# Patient Record
Sex: FEMALE | Race: WHITE | Employment: FULL TIME | URBAN - METROPOLITAN AREA
[De-identification: names, ages, dates, MRNs, and addresses within clinical notes are randomized per-mention and may not be internally consistent; named-entity substitution may affect disease eponyms.]

---

## 2017-03-27 ENCOUNTER — TELEPHONE (OUTPATIENT)
Dept: OBGYN | Age: 32
End: 2017-03-27

## 2017-03-27 DIAGNOSIS — G44.209 TENSION-TYPE HEADACHE, NOT INTRACTABLE, UNSPECIFIED CHRONICITY PATTERN: Primary | ICD-10-CM

## 2017-03-27 RX ORDER — BUTALBITAL, ACETAMINOPHEN AND CAFFEINE 50; 325; 40 MG/1; MG/1; MG/1
1 TABLET ORAL EVERY 4 HOURS PRN
Qty: 50 TABLET | Refills: 3 | Status: SHIPPED | OUTPATIENT
Start: 2017-03-27 | End: 2018-10-11

## 2017-03-28 ENCOUNTER — TELEPHONE (OUTPATIENT)
Dept: OBGYN | Age: 32
End: 2017-03-28

## 2017-03-28 DIAGNOSIS — G43.009 MIGRAINE WITHOUT AURA AND WITHOUT STATUS MIGRAINOSUS, NOT INTRACTABLE: Primary | ICD-10-CM

## 2017-03-28 RX ORDER — SUMATRIPTAN 50 MG/1
50 TABLET, FILM COATED ORAL
Qty: 9 TABLET | Refills: 3 | Status: SHIPPED | OUTPATIENT
Start: 2017-03-28 | End: 2018-10-11

## 2017-05-09 ENCOUNTER — OFFICE VISIT (OUTPATIENT)
Dept: OBGYN | Age: 32
End: 2017-05-09
Payer: COMMERCIAL

## 2017-05-09 ENCOUNTER — HOSPITAL ENCOUNTER (OUTPATIENT)
Age: 32
Setting detail: SPECIMEN
Discharge: HOME | End: 2017-05-09
Payer: COMMERCIAL

## 2017-05-09 VITALS
DIASTOLIC BLOOD PRESSURE: 68 MMHG | SYSTOLIC BLOOD PRESSURE: 134 MMHG | HEIGHT: 68 IN | WEIGHT: 192.8 LBS | BODY MASS INDEX: 29.22 KG/M2

## 2017-05-09 DIAGNOSIS — M79.10 MUSCLE ACHE: ICD-10-CM

## 2017-05-09 DIAGNOSIS — Z01.419 WOMEN'S ANNUAL ROUTINE GYNECOLOGICAL EXAMINATION: Primary | ICD-10-CM

## 2017-05-09 DIAGNOSIS — F43.21 SITUATIONAL DEPRESSION: ICD-10-CM

## 2017-05-09 DIAGNOSIS — E66.3 OVERWEIGHT (BMI 25.0-29.9): ICD-10-CM

## 2017-05-09 PROCEDURE — G0145 SCR C/V CYTO,THINLAYER,RESCR: HCPCS

## 2017-05-09 PROCEDURE — 99395 PREV VISIT EST AGE 18-39: CPT | Performed by: OBSTETRICS & GYNECOLOGY

## 2017-05-09 RX ORDER — IBUPROFEN 800 MG/1
800 TABLET ORAL EVERY 8 HOURS PRN
Qty: 90 TABLET | Refills: 5 | Status: SHIPPED | OUTPATIENT
Start: 2017-05-09 | End: 2018-10-11 | Stop reason: SDUPTHER

## 2017-05-09 RX ORDER — ESCITALOPRAM OXALATE 20 MG/1
20 TABLET ORAL DAILY
Qty: 30 TABLET | Refills: 12 | Status: SHIPPED | OUTPATIENT
Start: 2017-05-09 | End: 2018-10-11

## 2017-05-09 RX ORDER — ELETRIPTAN HYDROBROMIDE 40 MG/1
TABLET, FILM COATED ORAL
Refills: 0 | COMMUNITY
Start: 2017-04-05 | End: 2017-07-10 | Stop reason: SDUPTHER

## 2017-05-09 RX ORDER — SUMATRIPTAN 50 MG/1
TABLET, FILM COATED ORAL
Refills: 0 | COMMUNITY
Start: 2017-04-27 | End: 2018-10-11 | Stop reason: SDUPTHER

## 2017-05-09 RX ORDER — PHENTERMINE HYDROCHLORIDE 37.5 MG/1
37.5 CAPSULE ORAL EVERY MORNING
Qty: 30 CAPSULE | Refills: 0 | Status: SHIPPED | OUTPATIENT
Start: 2017-05-09 | End: 2017-05-31 | Stop reason: SDUPTHER

## 2017-05-10 DIAGNOSIS — Z01.419 WOMEN'S ANNUAL ROUTINE GYNECOLOGICAL EXAMINATION: ICD-10-CM

## 2017-05-12 LAB — CYTOLOGY REPORT: NORMAL

## 2017-05-31 ENCOUNTER — OFFICE VISIT (OUTPATIENT)
Dept: OBGYN | Age: 32
End: 2017-05-31
Payer: COMMERCIAL

## 2017-05-31 VITALS
SYSTOLIC BLOOD PRESSURE: 116 MMHG | WEIGHT: 178 LBS | BODY MASS INDEX: 26.98 KG/M2 | HEIGHT: 68 IN | DIASTOLIC BLOOD PRESSURE: 66 MMHG

## 2017-05-31 DIAGNOSIS — E66.3 OVERWEIGHT (BMI 25.0-29.9): ICD-10-CM

## 2017-05-31 PROCEDURE — 99212 OFFICE O/P EST SF 10 MIN: CPT | Performed by: OBSTETRICS & GYNECOLOGY

## 2017-05-31 RX ORDER — PHENTERMINE HYDROCHLORIDE 37.5 MG/1
37.5 CAPSULE ORAL EVERY MORNING
Qty: 30 CAPSULE | Refills: 0 | Status: SHIPPED | OUTPATIENT
Start: 2017-05-31 | End: 2017-06-30

## 2017-05-31 ASSESSMENT — PATIENT HEALTH QUESTIONNAIRE - PHQ9
SUM OF ALL RESPONSES TO PHQ QUESTIONS 1-9: 0
2. FEELING DOWN, DEPRESSED OR HOPELESS: 0
SUM OF ALL RESPONSES TO PHQ9 QUESTIONS 1 & 2: 0
1. LITTLE INTEREST OR PLEASURE IN DOING THINGS: 0

## 2017-07-10 RX ORDER — ELETRIPTAN HYDROBROMIDE 40 MG/1
TABLET, FILM COATED ORAL
Qty: 30 TABLET | Refills: 0 | Status: SHIPPED | OUTPATIENT
Start: 2017-07-10 | End: 2018-10-11

## 2018-10-11 ENCOUNTER — HOSPITAL ENCOUNTER (OUTPATIENT)
Age: 33
Setting detail: SPECIMEN
Discharge: HOME | End: 2018-10-11
Payer: COMMERCIAL

## 2018-10-11 ENCOUNTER — OFFICE VISIT (OUTPATIENT)
Dept: OBGYN | Age: 33
End: 2018-10-11
Payer: COMMERCIAL

## 2018-10-11 VITALS
WEIGHT: 188 LBS | SYSTOLIC BLOOD PRESSURE: 106 MMHG | HEIGHT: 69 IN | DIASTOLIC BLOOD PRESSURE: 62 MMHG | BODY MASS INDEX: 27.85 KG/M2

## 2018-10-11 DIAGNOSIS — M62.830 BACK SPASM: ICD-10-CM

## 2018-10-11 DIAGNOSIS — M79.10 MUSCLE ACHE: ICD-10-CM

## 2018-10-11 DIAGNOSIS — Z01.419 WOMEN'S ANNUAL ROUTINE GYNECOLOGICAL EXAMINATION: Primary | ICD-10-CM

## 2018-10-11 DIAGNOSIS — E66.3 OVERWEIGHT (BMI 25.0-29.9): ICD-10-CM

## 2018-10-11 DIAGNOSIS — Z01.419 WOMEN'S ANNUAL ROUTINE GYNECOLOGICAL EXAMINATION: ICD-10-CM

## 2018-10-11 DIAGNOSIS — G43.101 MIGRAINE WITH AURA AND WITH STATUS MIGRAINOSUS, NOT INTRACTABLE: ICD-10-CM

## 2018-10-11 PROCEDURE — 99395 PREV VISIT EST AGE 18-39: CPT | Performed by: OBSTETRICS & GYNECOLOGY

## 2018-10-11 PROCEDURE — G0145 SCR C/V CYTO,THINLAYER,RESCR: HCPCS

## 2018-10-11 RX ORDER — IBUPROFEN 800 MG/1
800 TABLET ORAL EVERY 8 HOURS PRN
Qty: 90 TABLET | Refills: 5 | Status: SHIPPED | OUTPATIENT
Start: 2018-10-11 | End: 2020-02-03 | Stop reason: ALTCHOICE

## 2018-10-11 RX ORDER — CYCLOBENZAPRINE HCL 10 MG
10 TABLET ORAL 3 TIMES DAILY PRN
Qty: 90 TABLET | Refills: 1 | Status: SHIPPED | OUTPATIENT
Start: 2018-10-11 | End: 2020-02-03 | Stop reason: ALTCHOICE

## 2018-10-11 RX ORDER — PHENTERMINE HYDROCHLORIDE 37.5 MG/1
37.5 TABLET ORAL
Qty: 30 TABLET | Refills: 0 | Status: SHIPPED | OUTPATIENT
Start: 2018-10-11 | End: 2020-01-06 | Stop reason: SDUPTHER

## 2018-10-11 RX ORDER — SUMATRIPTAN 50 MG/1
50 TABLET, FILM COATED ORAL
Qty: 9 TABLET | Refills: 5 | Status: SHIPPED | OUTPATIENT
Start: 2018-10-11 | End: 2020-01-06 | Stop reason: CLARIF

## 2018-10-11 ASSESSMENT — PATIENT HEALTH QUESTIONNAIRE - PHQ9
SUM OF ALL RESPONSES TO PHQ QUESTIONS 1-9: 0
1. LITTLE INTEREST OR PLEASURE IN DOING THINGS: 0
SUM OF ALL RESPONSES TO PHQ QUESTIONS 1-9: 0

## 2018-10-29 LAB — CYTOLOGY REPORT: NORMAL

## 2020-01-06 ENCOUNTER — HOSPITAL ENCOUNTER (OUTPATIENT)
Age: 35
Setting detail: SPECIMEN
Discharge: HOME | End: 2020-01-06
Payer: COMMERCIAL

## 2020-01-06 ENCOUNTER — OFFICE VISIT (OUTPATIENT)
Dept: OBGYN | Age: 35
End: 2020-01-06
Payer: COMMERCIAL

## 2020-01-06 VITALS
BODY MASS INDEX: 31.83 KG/M2 | WEIGHT: 210 LBS | SYSTOLIC BLOOD PRESSURE: 118 MMHG | HEIGHT: 68 IN | DIASTOLIC BLOOD PRESSURE: 78 MMHG

## 2020-01-06 PROCEDURE — 99395 PREV VISIT EST AGE 18-39: CPT | Performed by: OBSTETRICS & GYNECOLOGY

## 2020-01-06 PROCEDURE — G0145 SCR C/V CYTO,THINLAYER,RESCR: HCPCS

## 2020-01-06 RX ORDER — SUMATRIPTAN 50 MG/1
50 TABLET, FILM COATED ORAL
COMMUNITY
End: 2021-09-22 | Stop reason: SDUPTHER

## 2020-01-06 RX ORDER — PHENTERMINE HYDROCHLORIDE 37.5 MG/1
37.5 TABLET ORAL
Qty: 30 TABLET | Refills: 0 | Status: SHIPPED | OUTPATIENT
Start: 2020-01-06 | End: 2020-02-03 | Stop reason: SDUPTHER

## 2020-01-06 RX ORDER — BUTALBITAL, ACETAMINOPHEN AND CAFFEINE 50; 325; 40 MG/1; MG/1; MG/1
1 TABLET ORAL EVERY 4 HOURS PRN
Qty: 50 TABLET | Refills: 3 | Status: SHIPPED | OUTPATIENT
Start: 2020-01-06 | End: 2021-09-22 | Stop reason: SDUPTHER

## 2020-01-06 RX ORDER — CITALOPRAM 20 MG/1
20 TABLET ORAL DAILY
Qty: 30 TABLET | Refills: 5 | Status: SHIPPED | OUTPATIENT
Start: 2020-01-06 | End: 2022-10-13 | Stop reason: SDUPTHER

## 2020-01-06 RX ORDER — LORAZEPAM 1 MG/1
1 TABLET ORAL NIGHTLY PRN
Qty: 30 TABLET | Refills: 2 | Status: SHIPPED | OUTPATIENT
Start: 2020-01-06 | End: 2020-04-05

## 2020-01-06 NOTE — PROGRESS NOTES
YEARLY PHYSICAL    Date of service: 2020    Isaiah Montez  Is a 27 y.o.   female    PT's PCP is: No primary care provider on file. : 1985                                             Subjective:       Patient's last menstrual period was 2019 (approximate). Are your menses regular: yes    OB History    Para Term  AB Living   7 4 4   3     SAB TAB Ectopic Molar Multiple Live Births   3                # Outcome Date GA Lbr Ramiro/2nd Weight Sex Delivery Anes PTL Lv   7 SAB  10w0d          6 Term  37w0d   M CS-Unspec      5 Term  37w0d   M CS-Unspec      4 SAB  8w0d          3 Term  38w0d   M Vag-Spont         Birth Comments: stillborn   2 SAB  8w0d          1 Term  37w0d   M Vag-Spont           Social History     Tobacco Use   Smoking Status Former Smoker   Smokeless Tobacco Never Used        Social History     Substance and Sexual Activity   Alcohol Use Yes    Comment: social       Family History   Problem Relation Age of Onset    Diabetes Paternal Grandfather     Liver Disease Maternal Grandfather     Seizures Mother        Any family history of breast or ovarian cancer: No    Any family history of blood clots: No      Allergies: Patient has no known allergies. Current Outpatient Medications:     SUMAtriptan (IMITREX) 50 MG tablet, Take 50 mg by mouth once as needed for Migraine, Disp: , Rfl:     phentermine (ADIPEX-P) 37.5 MG tablet, Take 1 tablet by mouth every morning (before breakfast) for 30 days. , Disp: 30 tablet, Rfl: 0    butalbital-acetaminophen-caffeine (FIORICET, ESGIC) -40 MG per tablet, Take 1 tablet by mouth every 4 hours as needed for Headaches, Disp: 50 tablet, Rfl: 3    citalopram (CELEXA) 20 MG tablet, Take 1 tablet by mouth daily, Disp: 30 tablet, Rfl: 5    LORazepam (ATIVAN) 1 MG tablet, Take 1 tablet by mouth nightly as needed for Anxiety for up to 90 days. , Disp: 30 tablet, Rfl: 2    cyclobenzaprine (FLEXERIL) 10 MG tablet, Take 1 tablet by mouth 3 times daily as needed for Muscle spasms (Patient not taking: Reported on 2020), Disp: 90 tablet, Rfl: 1    ibuprofen (ADVIL;MOTRIN) 800 MG tablet, Take 1 tablet by mouth every 8 hours as needed for Pain (take with food) (Patient not taking: Reported on 2020), Disp: 90 tablet, Rfl: 5    Social History     Substance and Sexual Activity   Sexual Activity Not on file       Any bleeding or pain with intercourse: No    Last Yearly:  10/11/18    Last pap: 10/11/18    Last HPV:     Last Mammogram: NA    Last Dexascan NA    Last colorectal screen- type:  date      Do you do self breast exams: Yes    Past Medical History:   Diagnosis Date    Migraines        Past Surgical History:   Procedure Laterality Date     SECTION      DILATION AND CURETTAGE      TUBAL LIGATION         Family History   Problem Relation Age of Onset    Diabetes Paternal Grandfather     Liver Disease Maternal Grandfather     Seizures Mother        Chief Complaint   Patient presents with    Gynecologic Exam     Patient here for yearly exam; last pap was 10/11/18 NL pap; patient states she has gained 25 pounds in the last 3 months, her lifestyle has not changed at all. PE:  Vital Signs  Blood pressure 118/78, height 5' 8\" (1.727 m), weight 210 lb (95.3 kg), last menstrual period 2019, not currently breastfeeding. Labs:    No results found for this visit on 20. NURSE: ALLYN Nguyen    HPI: The patient is here today for a yearly exam.  She is also complaining of situational anxiety. She is preparing to buy an a new house and move this is upsetting her significantly. In the past she was taking Ativan especially at night to help with her nerves.   So wishes to restart Adipex and would like to have a thyroid screening done for weight gain    Review of Systems      Objective  Lymphatic:   no

## 2020-01-09 LAB — CYTOLOGY REPORT: NORMAL

## 2020-01-25 ENCOUNTER — APPOINTMENT (OUTPATIENT)
Dept: CT IMAGING | Age: 35
End: 2020-01-25
Payer: COMMERCIAL

## 2020-01-25 ENCOUNTER — HOSPITAL ENCOUNTER (EMERGENCY)
Age: 35
Discharge: HOME | End: 2020-01-25
Attending: EMERGENCY MEDICINE
Payer: COMMERCIAL

## 2020-01-25 VITALS
RESPIRATION RATE: 18 BRPM | SYSTOLIC BLOOD PRESSURE: 123 MMHG | BODY MASS INDEX: 29.65 KG/M2 | TEMPERATURE: 97.2 F | OXYGEN SATURATION: 100 % | DIASTOLIC BLOOD PRESSURE: 89 MMHG | WEIGHT: 195 LBS | HEART RATE: 78 BPM

## 2020-01-25 PROCEDURE — 99284 EMERGENCY DEPT VISIT MOD MDM: CPT

## 2020-01-25 PROCEDURE — 70450 CT HEAD/BRAIN W/O DYE: CPT

## 2020-01-25 PROCEDURE — 96372 THER/PROPH/DIAG INJ SC/IM: CPT

## 2020-01-25 PROCEDURE — 6360000002 HC RX W HCPCS: Performed by: EMERGENCY MEDICINE

## 2020-01-25 PROCEDURE — 96375 TX/PRO/DX INJ NEW DRUG ADDON: CPT

## 2020-01-25 PROCEDURE — 6370000000 HC RX 637 (ALT 250 FOR IP): Performed by: EMERGENCY MEDICINE

## 2020-01-25 PROCEDURE — 96374 THER/PROPH/DIAG INJ IV PUSH: CPT

## 2020-01-25 RX ORDER — DIPHENHYDRAMINE HCL 25 MG
25 CAPSULE ORAL ONCE
Status: COMPLETED | OUTPATIENT
Start: 2020-01-25 | End: 2020-01-25

## 2020-01-25 RX ORDER — PROCHLORPERAZINE EDISYLATE 5 MG/ML
10 INJECTION INTRAMUSCULAR; INTRAVENOUS ONCE
Status: COMPLETED | OUTPATIENT
Start: 2020-01-25 | End: 2020-01-25

## 2020-01-25 RX ORDER — DIPHENHYDRAMINE HYDROCHLORIDE 50 MG/ML
25 INJECTION INTRAMUSCULAR; INTRAVENOUS ONCE
Status: COMPLETED | OUTPATIENT
Start: 2020-01-25 | End: 2020-01-25

## 2020-01-25 RX ORDER — KETOROLAC TROMETHAMINE 15 MG/ML
15 INJECTION, SOLUTION INTRAMUSCULAR; INTRAVENOUS ONCE
Status: COMPLETED | OUTPATIENT
Start: 2020-01-25 | End: 2020-01-25

## 2020-01-25 RX ORDER — ACETAMINOPHEN 325 MG/1
650 TABLET ORAL ONCE
Status: COMPLETED | OUTPATIENT
Start: 2020-01-25 | End: 2020-01-25

## 2020-01-25 RX ORDER — METOCLOPRAMIDE HYDROCHLORIDE 5 MG/ML
10 INJECTION INTRAMUSCULAR; INTRAVENOUS ONCE
Status: COMPLETED | OUTPATIENT
Start: 2020-01-25 | End: 2020-01-25

## 2020-01-25 RX ADMIN — METOCLOPRAMIDE 10 MG: 5 INJECTION, SOLUTION INTRAMUSCULAR; INTRAVENOUS at 21:15

## 2020-01-25 RX ADMIN — PROCHLORPERAZINE EDISYLATE 10 MG: 5 INJECTION INTRAMUSCULAR; INTRAVENOUS at 20:08

## 2020-01-25 RX ADMIN — DIPHENHYDRAMINE HYDROCHLORIDE 25 MG: 50 INJECTION, SOLUTION INTRAMUSCULAR; INTRAVENOUS at 21:17

## 2020-01-25 RX ADMIN — KETOROLAC TROMETHAMINE 15 MG: 15 INJECTION, SOLUTION INTRAMUSCULAR; INTRAVENOUS at 21:12

## 2020-01-25 RX ADMIN — ACETAMINOPHEN 650 MG: 325 TABLET, FILM COATED ORAL at 21:12

## 2020-01-25 RX ADMIN — KETOROLAC TROMETHAMINE 15 MG: 15 INJECTION, SOLUTION INTRAMUSCULAR; INTRAVENOUS at 20:08

## 2020-01-25 RX ADMIN — DIPHENHYDRAMINE HYDROCHLORIDE 25 MG: 25 CAPSULE ORAL at 20:07

## 2020-01-25 ASSESSMENT — ENCOUNTER SYMPTOMS
SHORTNESS OF BREATH: 0
WHEEZING: 0
COLOR CHANGE: 0
RHINORRHEA: 0
VOMITING: 0
ABDOMINAL PAIN: 0
NAUSEA: 0

## 2020-01-25 ASSESSMENT — PAIN DESCRIPTION - LOCATION: LOCATION: HEAD

## 2020-01-25 ASSESSMENT — PAIN SCALES - GENERAL
PAINLEVEL_OUTOF10: 8

## 2020-01-25 ASSESSMENT — PAIN DESCRIPTION - PAIN TYPE: TYPE: ACUTE PAIN

## 2020-01-26 NOTE — ED PROVIDER NOTES
[facility] ED  Emergency Department Encounter  EmergencyMedicine Attending     Pt Name:Brie Park  MRN: [id number]  [place] 1985  Date of evaluation: 20  PCP:  No primary care provider on file. CHIEF COMPLAINT       Chief Complaint   Patient presents with    Headache     left frontal, took fiorcet at 1700 with no relief    Numbness     tongue and right leg 1740       HISTORY OF PRESENT ILLNESS  (Location/Symptom, Timing/Onset, Context/Setting, Quality, Duration, Modifying Factors, Severity.)      Ga Livingston is a 27 y.o. female who presents with her usual migraine headache. Patient says that she has been having these migraine headaches since she was 24years old, no previous imaging in our medical records however says that she has had previous imaging done at another facility and it was unremarkable. Reports nausea but no vomiting. Reports photophobia. Similar to previous episodes of her headache. The last time she had a similar headache was a few weeks ago, no thunderclap quality, headache started around 1 PM and has been slowly progressive. Headache is similar to previous episodes except there is some tingling in her tongue that she usually does not get but otherwise no sensory or motor deficit. No numbness weakness of the bilateral upper or lower extremities. Initially took a Fioricet at 1:00 which helped, but eventually the headache got worse and family brought her here for an evaluation. No chest pain shortness of breath difficulty breathing, no trauma or falls. No other complaints. 8 out of 10 headache, throbbing, associated with nausea, photophobia. No radiations of the pain. PAST MEDICAL / SURGICAL / SOCIAL / FAMILY HISTORY      has a past medical history of Migraines. has a past surgical history that includes  section; Dilation & curettage; and Tubal ligation.     Social History     Socioeconomic History    Marital status: Single     Spouse name: Not on file    Number of children: Not on file    Years of education: Not on file    Highest education level: Not on file   Occupational History    Not on file   Social Needs    Financial resource strain: Not on file    Food insecurity:     Worry: Not on file     Inability: Not on file    Transportation needs:     Medical: Not on file     Non-medical: Not on file   Tobacco Use    Smoking status: Former Smoker    Smokeless tobacco: Never Used   Substance and Sexual Activity    Alcohol use: Yes     Comment: social    Drug use: No    Sexual activity: Not on file   Lifestyle    Physical activity:     Days per week: Not on file     Minutes per session: Not on file    Stress: Not on file   Relationships    Social connections:     Talks on phone: Not on file     Gets together: Not on file     Attends Je[employer]'s witness service: Not on file     Active member of club or organization: Not on file     Attends meetings of clubs or organizations: Not on file     Relationship status: Not on file    Intimate partner violence:     Fear of current or ex partner: Not on file     Emotionally abused: Not on file     Physically abused: Not on file     Forced sexual activity: Not on file   Other Topics Concern    Not on file   Social History Narrative    Not on file       Family History   Problem Relation Age of Onset    Diabetes Paternal Grandfather     Liver Disease Maternal Grandfather     Seizures Mother        Allergies:  Patient has no known allergies. Home Medications:  Prior to Admission medications    Medication Sig Start Date End Date Taking? Authorizing Provider   butalbital-acetaminophen-caffeine (FIORICET, ESGIC) -43 MG per tablet Take 1 tablet by mouth every 4 hours as needed for Headaches 1/6/20  Yes Dilai Moe MD   LORazepam (ATIVAN) 1 MG tablet Take 1 tablet by mouth nightly as needed for Anxiety for up to 90 days.  1/6/20 4/5/20 Yes Dilia Moe MD   SUMAtriptan (IMITREX) 50 MG tablet Take 50 mg by mouth once as needed for Migraine    Historical Provider, MD   phentermine (ADIPEX-P) 37.5 MG tablet Take 1 tablet by mouth every morning (before breakfast) for 30 days. 1/6/20 2/5/20  Paresh Tam MD   citalopram (CELEXA) 20 MG tablet Take 1 tablet by mouth daily 1/6/20   Paresh Tam MD   cyclobenzaprine (FLEXERIL) 10 MG tablet Take 1 tablet by mouth 3 times daily as needed for Muscle spasms  Patient not taking: Reported on 1/6/2020 10/11/18   Paresh Tam MD   ibuprofen (ADVIL;MOTRIN) 800 MG tablet Take 1 tablet by mouth every 8 hours as needed for Pain (take with food)  Patient not taking: Reported on 1/6/2020 10/11/18   Paresh Tam MD       REVIEW OF SYSTEMS    (2-9 systems for level 4, 10 or more for level 5)      Review of Systems   Constitutional: Negative for chills and fever. HENT: Negative for congestion and rhinorrhea. Respiratory: Negative for shortness of breath and wheezing. Cardiovascular: Negative for chest pain and leg swelling. Gastrointestinal: Negative for abdominal pain, nausea and vomiting. Genitourinary: Negative for dysuria. Skin: Negative for color change. Neurological: Positive for headaches. Negative for tremors, speech difficulty, weakness and numbness. Psychiatric/Behavioral: Negative for agitation. PHYSICAL EXAM   (up to 7 for level 4, 8 or more for level 5)      INITIAL VITALS:   /89   Pulse 78   Temp 97.2 °F (36.2 °C) (Temporal)   Resp 18   Wt 195 lb (88.5 kg)   LMP 01/01/2020   SpO2 100%   BMI 29.65 kg/m²     Physical Exam  Constitutional:       General: She is not in acute distress. Appearance: She is well-developed. HENT:      Head: Normocephalic and atraumatic. Mouth/Throat:      Pharynx: No oropharyngeal exudate or posterior oropharyngeal erythema. Cardiovascular:      Rate and Rhythm: Normal rate and regular rhythm. Heart sounds: Normal heart sounds. No murmur. No friction rub. No gallop.

## 2020-01-26 NOTE — ED NOTES
States headache is \"unchanged\" behind her eye but no better. Feels that she is \"talking better and not slurring my words. \" FREDERICK Robin RN  01/25/20 [id number]

## 2020-02-03 ENCOUNTER — OFFICE VISIT (OUTPATIENT)
Dept: OBGYN | Age: 35
End: 2020-02-03
Payer: COMMERCIAL

## 2020-02-03 VITALS
WEIGHT: 195 LBS | DIASTOLIC BLOOD PRESSURE: 66 MMHG | BODY MASS INDEX: 29.55 KG/M2 | HEIGHT: 68 IN | SYSTOLIC BLOOD PRESSURE: 118 MMHG

## 2020-02-03 PROCEDURE — 99212 OFFICE O/P EST SF 10 MIN: CPT | Performed by: OBSTETRICS & GYNECOLOGY

## 2020-02-03 RX ORDER — PHENTERMINE HYDROCHLORIDE 37.5 MG/1
37.5 TABLET ORAL
Qty: 30 TABLET | Refills: 0 | Status: SHIPPED | OUTPATIENT
Start: 2020-02-03 | End: 2020-03-04 | Stop reason: SDUPTHER

## 2020-02-03 NOTE — PROGRESS NOTES
25.0-29.9)  phentermine (ADIPEX-P) 37.5 MG tablet   2. Obesity (BMI 30.0-34.9)  phentermine (ADIPEX-P) 37.5 MG tablet             No follow-ups on file. FF: 10 minutes    There are no Patient Instructions on file for this visit. Over 75%of time spent on counseling and care coordination on: see assessment and plan,  She was also counseled on her preventative health maintenance recommendations and follow-up.       Kisha Mora 3:25 PM

## 2020-03-04 ENCOUNTER — OFFICE VISIT (OUTPATIENT)
Dept: OBGYN | Age: 35
End: 2020-03-04
Payer: COMMERCIAL

## 2020-03-04 ENCOUNTER — HOSPITAL ENCOUNTER (OUTPATIENT)
Age: 35
Discharge: HOME | End: 2020-03-04
Payer: COMMERCIAL

## 2020-03-04 VITALS
HEIGHT: 68 IN | DIASTOLIC BLOOD PRESSURE: 62 MMHG | SYSTOLIC BLOOD PRESSURE: 110 MMHG | BODY MASS INDEX: 29.1 KG/M2 | WEIGHT: 192 LBS

## 2020-03-04 LAB — TSH SERPL DL<=0.05 MIU/L-ACNC: 3.46 MIU/L (ref 0.3–5)

## 2020-03-04 PROCEDURE — 99212 OFFICE O/P EST SF 10 MIN: CPT | Performed by: OBSTETRICS & GYNECOLOGY

## 2020-03-04 PROCEDURE — 84443 ASSAY THYROID STIM HORMONE: CPT

## 2020-03-04 PROCEDURE — 36415 COLL VENOUS BLD VENIPUNCTURE: CPT

## 2020-03-04 RX ORDER — PHENTERMINE HYDROCHLORIDE 37.5 MG/1
37.5 TABLET ORAL
Qty: 30 TABLET | Refills: 0 | Status: SHIPPED | OUTPATIENT
Start: 2020-03-04 | End: 2020-04-03

## 2020-03-04 NOTE — PROGRESS NOTES
phentermine (ADIPEX-P) 37.5 MG tablet   2. Obesity (BMI 30.0-34.9)  phentermine (ADIPEX-P) 37.5 MG tablet             No follow-ups on file. FF: 10 minutes    There are no Patient Instructions on file for this visit. Over 75%of time spent on counseling and care coordination on: see assessment and plan,  She was also counseled on her preventative health maintenance recommendations and follow-up.       Jenny Chirinos 5:03 PM

## 2021-09-22 ENCOUNTER — HOSPITAL ENCOUNTER (OUTPATIENT)
Age: 36
Setting detail: SPECIMEN
Discharge: HOME | End: 2021-09-22
Payer: COMMERCIAL

## 2021-09-22 ENCOUNTER — OFFICE VISIT (OUTPATIENT)
Dept: OBGYN | Age: 36
End: 2021-09-22
Payer: COMMERCIAL

## 2021-09-22 VITALS
DIASTOLIC BLOOD PRESSURE: 76 MMHG | WEIGHT: 220 LBS | HEIGHT: 68 IN | SYSTOLIC BLOOD PRESSURE: 118 MMHG | BODY MASS INDEX: 33.34 KG/M2

## 2021-09-22 DIAGNOSIS — Z01.419 WOMEN'S ANNUAL ROUTINE GYNECOLOGICAL EXAMINATION: ICD-10-CM

## 2021-09-22 DIAGNOSIS — Z01.419 WOMEN'S ANNUAL ROUTINE GYNECOLOGICAL EXAMINATION: Primary | ICD-10-CM

## 2021-09-22 DIAGNOSIS — G44.209 TENSION-TYPE HEADACHE, NOT INTRACTABLE, UNSPECIFIED CHRONICITY PATTERN: ICD-10-CM

## 2021-09-22 PROCEDURE — G0145 SCR C/V CYTO,THINLAYER,RESCR: HCPCS

## 2021-09-22 PROCEDURE — 99395 PREV VISIT EST AGE 18-39: CPT | Performed by: OBSTETRICS & GYNECOLOGY

## 2021-09-22 RX ORDER — SUMATRIPTAN 50 MG/1
50 TABLET, FILM COATED ORAL
Qty: 9 TABLET | Refills: 3 | Status: SHIPPED | OUTPATIENT
Start: 2021-09-22 | End: 2021-09-22

## 2021-09-22 RX ORDER — BUTALBITAL, ACETAMINOPHEN AND CAFFEINE 50; 325; 40 MG/1; MG/1; MG/1
1 TABLET ORAL EVERY 4 HOURS PRN
Qty: 50 TABLET | Refills: 5 | Status: SHIPPED | OUTPATIENT
Start: 2021-09-22 | End: 2022-10-13 | Stop reason: SDUPTHER

## 2021-09-22 NOTE — PROGRESS NOTES
YEARLY PHYSICAL    Date of service: 2021    Jerilyn Rodriguez  Is a 39 y.o.  single female    PT's PCP is: No primary care provider on file. : 1985                                             Subjective:       No LMP recorded. Are your menses regular: yes    OB History    Para Term  AB Living   7 4 4   3     SAB TAB Ectopic Molar Multiple Live Births   3                # Outcome Date GA Lbr Ramiro/2nd Weight Sex Delivery Anes PTL Lv   7 SAB  10w0d          6 Term  37w0d   M CS-Unspec      5 Term  37w0d   M CS-Unspec      4 SAB  8w0d          3 Term  38w0d   M Vag-Spont         Birth Comments: stillborn   2 SAB  8w0d          1 Term  37w0d   M Vag-Spont           Social History     Tobacco Use   Smoking Status Former Smoker   Smokeless Tobacco Never Used        Social History     Substance and Sexual Activity   Alcohol Use Yes    Comment: social       Family History   Problem Relation Age of Onset    Diabetes Paternal Grandfather     Liver Disease Maternal Grandfather     Seizures Mother        Allergies: Patient has no known allergies.       Current Outpatient Medications:     butalbital-acetaminophen-caffeine (FIORICET, ESGIC) -40 MG per tablet, Take 1 tablet by mouth every 4 hours as needed for Headaches, Disp: 50 tablet, Rfl: 5    SUMAtriptan (IMITREX) 50 MG tablet, Take 1 tablet by mouth once as needed for Migraine, Disp: 9 tablet, Rfl: 3    citalopram (CELEXA) 20 MG tablet, Take 1 tablet by mouth daily, Disp: 30 tablet, Rfl: 5    Social History     Substance and Sexual Activity   Sexual Activity Not Currently       Any bleeding or pain with intercourse: N/A    Last Yearly:  20    Last pap: 20    Last HPV: never    Last Mammogram: never    Last Dexascan never    Do you do self breast exams: Yes    Past Medical History:   Diagnosis Date    Migraines        Past Surgical History:   Procedure Laterality Date     SECTION      DILATION AND CURETTAGE      TUBAL LIGATION         Family History   Problem Relation Age of Onset    Diabetes Paternal Grandfather     Liver Disease Maternal Grandfather     Seizures Mother        Any family history of breast or ovarian cancer: No    Any family history of blood clots: No      Chief Complaint   Patient presents with    Gynecologic Exam     Pt presents today for yearly exam. No concerns          Nurse: Adam Sanchez    PE:  Vital Signs  Blood pressure 118/76, height 5' 8\" (1.727 m), weight 220 lb (99.8 kg), not currently breastfeeding. Labs:    No results found for this visit on 21. HPI: The patient is here today for GYN exam.  She expresses no complaints. She did request refill of migraine headache medications    NoPT denies fever, chills, nausea and vomiting       Objective  Lymphatic:   no lymphadenopathy  Heent:   negative   Cor: regular rate and rhythm, no murmurs              Pul:clear to auscultation bilaterally- no wheezes, rales or rhonchi, normal air movement, no respiratory distress      GI: Abdomen soft, non-tender.  BS normal. No masses,  No organomegaly           Extremities: normal strength, tone, and muscle mass   Breasts: Breast:normal appearance, no masses or tenderness, Inspection negative, No nipple retraction or dimpling, No nipple discharge or bleeding, No axillary or supraclavicular adenopathy, Normal to palpation without dominant masses   Pelvic Exam: GENITAL/URINARY:  External Genitalia:  General appearance; normal, Hair distribution; normal, Lesions absent  Vagina:  General appearance normal, Estrogen effect normal, Discharge absent, Lesions absent, Pelvic support normal  Cervix:  General appearance normal, Lesions absent, Discharge absent, Tenderness absent, Enlargement absent, Nodularity absent  Uterus:  Size normal, Contour normal, Position normal, Masses absent, Consistency; normal, Support normal, Tenderness absent  Adenexa: Masses absent, Tenderness absent, Enlargement absent, Nodularity absent                                      Vaginal discharge: no vaginal discharge      Uterus: normal size, anteverted, mobile, non-tender, normal shape and consistency     Ovaries: Nonenlarged nontender           Over 50% of time spent on counseling and care coordination on: see assessment and plan                        Assessment and Plan: Routine care        Diagnosis Orders   1. Women's annual routine gynecological examination  PAP SMEAR   2. Tension-type headache, not intractable, unspecified chronicity pattern  butalbital-acetaminophen-caffeine (FIORICET, ESGIC) -40 MG per tablet       I have changed Brie Tena's SUMAtriptan. I am also having her maintain her citalopram and butalbital-acetaminophen-caffeine.

## 2021-09-27 DIAGNOSIS — B96.89 BV (BACTERIAL VAGINOSIS): Primary | ICD-10-CM

## 2021-09-27 DIAGNOSIS — N76.0 BV (BACTERIAL VAGINOSIS): Primary | ICD-10-CM

## 2021-09-27 LAB — CYTOLOGY REPORT: NORMAL

## 2021-09-27 RX ORDER — METRONIDAZOLE 500 MG/1
500 TABLET ORAL 2 TIMES DAILY
Qty: 14 TABLET | Refills: 0 | Status: SHIPPED | OUTPATIENT
Start: 2021-09-27 | End: 2021-10-04

## 2022-02-23 RX ORDER — METRONIDAZOLE 500 MG/1
TABLET ORAL
Qty: 14 TABLET | OUTPATIENT
Start: 2022-02-23

## 2022-03-21 ENCOUNTER — TELEPHONE (OUTPATIENT)
Dept: OBGYN | Age: 37
End: 2022-03-21

## 2022-03-21 DIAGNOSIS — Z91.09 ALLERGY TO POISON IVY: Primary | ICD-10-CM

## 2022-03-21 RX ORDER — METHYLPREDNISOLONE 4 MG/1
TABLET ORAL
Qty: 1 KIT | Refills: 0 | Status: SHIPPED | OUTPATIENT
Start: 2022-03-21 | End: 2022-10-13

## 2022-03-21 NOTE — TELEPHONE ENCOUNTER
Pt called and left message requesting prescription for poison ivy.  Pt states she does not have a PCP

## 2022-10-07 ENCOUNTER — NURSE TRIAGE (OUTPATIENT)
Dept: OTHER | Age: 37
End: 2022-10-07

## 2022-10-07 NOTE — TELEPHONE ENCOUNTER
Pt states she is not pregnant. She has been having two periods a month. Today cramping more than usual but no bleeding present. Pt crying on the phone. Pt states she had procedure 7 years ago for pregnancy. Recommend pt treat discomfort with OTC medication, rest and heating pad. Call office on Monday when open for an appointment. Call back with questions if needed. Pt agrees. Dwb/rn  Reason for Disposition   Health Information question, no triage required and triager able to answer question    Answer Assessment - Initial Assessment Questions  1. REASON FOR CALL or QUESTION: \"What is your reason for calling today? \" or \"How can I best help you? \" or \"What question do you have that I can help answer? \"      Abdominal cramping    Protocols used:  Information Only Call - No Triage-[facility]

## 2022-10-13 ENCOUNTER — HOSPITAL ENCOUNTER (OUTPATIENT)
Age: 37
Setting detail: SPECIMEN
Discharge: HOME | End: 2022-10-13
Payer: COMMERCIAL

## 2022-10-13 ENCOUNTER — OFFICE VISIT (OUTPATIENT)
Dept: OBGYN | Age: 37
End: 2022-10-13

## 2022-10-13 VITALS
SYSTOLIC BLOOD PRESSURE: 122 MMHG | WEIGHT: 224 LBS | DIASTOLIC BLOOD PRESSURE: 80 MMHG | HEIGHT: 68 IN | BODY MASS INDEX: 33.95 KG/M2

## 2022-10-13 DIAGNOSIS — G44.209 TENSION-TYPE HEADACHE, NOT INTRACTABLE, UNSPECIFIED CHRONICITY PATTERN: ICD-10-CM

## 2022-10-13 DIAGNOSIS — F41.8 SITUATIONAL ANXIETY: ICD-10-CM

## 2022-10-13 DIAGNOSIS — Z01.419 WOMEN'S ANNUAL ROUTINE GYNECOLOGICAL EXAMINATION: Primary | ICD-10-CM

## 2022-10-13 DIAGNOSIS — E66.9 OBESITY (BMI 30.0-34.9): ICD-10-CM

## 2022-10-13 DIAGNOSIS — Z01.419 WOMEN'S ANNUAL ROUTINE GYNECOLOGICAL EXAMINATION: ICD-10-CM

## 2022-10-13 PROCEDURE — G0145 SCR C/V CYTO,THINLAYER,RESCR: HCPCS

## 2022-10-13 PROCEDURE — 99395 PREV VISIT EST AGE 18-39: CPT | Performed by: OBSTETRICS & GYNECOLOGY

## 2022-10-13 RX ORDER — BUTALBITAL, ACETAMINOPHEN AND CAFFEINE 50; 325; 40 MG/1; MG/1; MG/1
1 TABLET ORAL EVERY 4 HOURS PRN
Qty: 50 TABLET | Refills: 5 | Status: SHIPPED | OUTPATIENT
Start: 2022-10-13

## 2022-10-13 RX ORDER — CITALOPRAM 20 MG/1
20 TABLET ORAL DAILY
Qty: 30 TABLET | Refills: 12 | Status: SHIPPED | OUTPATIENT
Start: 2022-10-13

## 2022-10-13 RX ORDER — PHENTERMINE HYDROCHLORIDE 37.5 MG/1
37.5 TABLET ORAL
Qty: 30 TABLET | Refills: 0 | Status: SHIPPED | OUTPATIENT
Start: 2022-10-13 | End: 2022-11-12

## 2022-10-13 NOTE — PROGRESS NOTES
YEARLY PHYSICAL    Date of service: 10/13/2022    Phillip Chao  Is a 44 y.o.  single female    PT's PCP is: No primary care provider on file. : 1985                                             Subjective:       Patient's last menstrual period was 10/04/2022 (exact date). Are your menses regular: yes    OB History    Para Term  AB Living   7 4 4   3     SAB IAB Ectopic Molar Multiple Live Births   3                # Outcome Date GA Lbr Ramiro/2nd Weight Sex Delivery Anes PTL Lv   7 SAB  10w0d          6 Term  37w0d   M CS-Unspec      5 Term  37w0d   M CS-Unspec      4 SAB  8w0d          3 Term  38w0d   M Vag-Spont         Birth Comments: stillborn   2 SAB  8w0d          1 Term  37w0d   M Vag-Spont           Social History     Tobacco Use   Smoking Status Former   Smokeless Tobacco Never        Social History     Substance and Sexual Activity   Alcohol Use Yes    Comment: social       Family History   Problem Relation Age of Onset    Diabetes Paternal Grandfather     Liver Disease Maternal Grandfather     Seizures Mother        Allergies: Patient has no known allergies. Current Outpatient Medications:     butalbital-acetaminophen-caffeine (FIORICET, ESGIC) -40 MG per tablet, Take 1 tablet by mouth every 4 hours as needed for Headaches, Disp: 50 tablet, Rfl: 5    citalopram (CELEXA) 20 MG tablet, Take 1 tablet by mouth daily, Disp: 30 tablet, Rfl: 12    phentermine (ADIPEX-P) 37.5 MG tablet, Take 1 tablet by mouth every morning (before breakfast) for 30 days. , Disp: 30 tablet, Rfl: 0    SUMAtriptan (IMITREX) 50 MG tablet, Take 1 tablet by mouth once as needed for Migraine, Disp: 9 tablet, Rfl: 3    Social History     Substance and Sexual Activity   Sexual Activity Yes    Partners: Male       Any bleeding or pain with intercourse: Yes-spotting at times    Last Yearly:  21    Last pap: 21    Last HPV: nerv    Last Mammogram: never    Last Dexascan never    Do you do self breast exams: Yes    Past Medical History:   Diagnosis Date    Migraines        Past Surgical History:   Procedure Laterality Date     SECTION      DILATION AND CURETTAGE      TUBAL LIGATION         Family History   Problem Relation Age of Onset    Diabetes Paternal Grandfather     Liver Disease Maternal Grandfather     Seizures Mother        Any family history of breast or ovarian cancer: No    Any family history of blood clots: No      Chief Complaint   Patient presents with    Gynecologic Exam     Pt is here today for yearly gyn exam. Pt has c/o of having to cycles last month, one was 3 days and one was 6, her normal cycle length is 3 days. Cycles were always regular until last month. Pt would also like to discuss restarting on her celexa. Nurse: LMD  PE:  Vital Signs  Blood pressure 122/80, height 5' 8\" (1.727 m), weight 224 lb (101.6 kg), last menstrual period 10/04/2022, not currently breastfeeding. Labs:    No results found for this visit on 10/13/22. HPI: The patient is here today for a yearly exam.  She did request some refills of medication. Being her Celexa and Fioricet and she also wished to restart Adipex. YesPT denies fever, chills, nausea and vomiting       Objective  Lymphatic:   no lymphadenopathy  Heent:   negative   Cor: regular rate and rhythm, no murmurs              Pul:clear to auscultation bilaterally- no wheezes, rales or rhonchi, normal air movement, no respiratory distress      GI: Abdomen soft, non-tender.  BS normal. No masses,  No organomegaly, mild obesity noted, well-healed old scar in lower abdomen           Extremities: normal strength, tone, and muscle mass   Breasts: Breast:normal appearance, no masses or tenderness, Inspection negative, No nipple retraction or dimpling, No nipple discharge or bleeding, No axillary or supraclavicular adenopathy, Normal to palpation without dominant masses   Pelvic Exam: GENITAL/URINARY:  External Genitalia:  General appearance; normal, Hair distribution; normal, Lesions absent  Vagina:  General appearance normal, Estrogen effect normal, Discharge absent, Lesions absent, Pelvic support normal  Cervix:  General appearance normal, Lesions absent, Discharge absent, Tenderness absent, Enlargement absent, Nodularity absent  Uterus:  Size normal, Contour normal, Position normal, Masses absent, Consistency; normal, Support normal, Tenderness absent  Adenexa: Masses absent, Tenderness absent, Enlargement absent, Nodularity absent                                      Vaginal discharge: no vaginal discharge      Uterus: normal size, anteverted, mobile, non-tender, normal shape and consistency     Ovaries: Nonenlarged nontender           Over 50% of time spent on counseling and care coordination on: see assessment and plan                        Assessment and Plan: I did review Adipex with the patient and did send in prescription for her        Diagnosis Orders   1. Women's annual routine gynecological examination  PAP SMEAR      2. Tension-type headache, not intractable, unspecified chronicity pattern  butalbital-acetaminophen-caffeine (FIORICET, ESGIC) -40 MG per tablet      3. Situational anxiety  citalopram (CELEXA) 20 MG tablet      4. Obesity (BMI 30.0-34.9)  phentermine (ADIPEX-P) 37.5 MG tablet          I have discontinued Brie Tena's methylPREDNISolone. I am also having her start on phentermine.  Additionally, I am having her maintain her SUMAtriptan, butalbital-acetaminophen-caffeine, and citalopram.

## 2022-10-19 DIAGNOSIS — B96.89 BV (BACTERIAL VAGINOSIS): Primary | ICD-10-CM

## 2022-10-19 DIAGNOSIS — N76.0 BV (BACTERIAL VAGINOSIS): Primary | ICD-10-CM

## 2022-10-19 LAB — CYTOLOGY REPORT: NORMAL

## 2022-10-20 RX ORDER — METRONIDAZOLE 500 MG/1
500 TABLET ORAL 2 TIMES DAILY
Qty: 14 TABLET | Refills: 0 | Status: SHIPPED | OUTPATIENT
Start: 2022-10-20 | End: 2022-10-27

## 2022-11-10 ENCOUNTER — OFFICE VISIT (OUTPATIENT)
Dept: OBGYN | Age: 37
End: 2022-11-10
Payer: COMMERCIAL

## 2022-11-10 VITALS
WEIGHT: 211 LBS | DIASTOLIC BLOOD PRESSURE: 68 MMHG | BODY MASS INDEX: 31.98 KG/M2 | HEIGHT: 68 IN | SYSTOLIC BLOOD PRESSURE: 118 MMHG

## 2022-11-10 DIAGNOSIS — E66.9 OBESITY (BMI 30.0-34.9): Primary | ICD-10-CM

## 2022-11-10 PROCEDURE — 99212 OFFICE O/P EST SF 10 MIN: CPT | Performed by: OBSTETRICS & GYNECOLOGY

## 2022-11-10 RX ORDER — PHENTERMINE HYDROCHLORIDE 37.5 MG/1
37.5 TABLET ORAL
Qty: 30 TABLET | Refills: 0 | Status: SHIPPED | OUTPATIENT
Start: 2022-11-10 | End: 2022-12-10

## 2022-11-10 NOTE — PROGRESS NOTES
PROBLEM VISIT     Date of service: 11/10/2022    Ellard Baumgarten  Is a 44 y.o. single female    PT's PCP is: No primary care provider on file. : 1985                                             Subjective:       Patient's last menstrual period was 2022 (approximate). OB History    Para Term  AB Living   7 4 4   3     SAB IAB Ectopic Molar Multiple Live Births   3                # Outcome Date GA Lbr Ramiro/2nd Weight Sex Delivery Anes PTL Lv   7 SAB  10w0d          6 Term  37w0d   M CS-Unspec      5 Term  37w0d   M CS-Unspec      4 SAB  8w0d          3 Term  38w0d   M Vag-Spont         Birth Comments: stillborn   2 SAB  8w0d          1 Term  37w0d   M Vag-Spont           Social History     Tobacco Use   Smoking Status Former   Smokeless Tobacco Never        Social History     Substance and Sexual Activity   Alcohol Use Yes    Comment: social       Social History     Substance and Sexual Activity   Sexual Activity Yes    Partners: Male       Allergies: Patient has no known allergies. Chief Complaint   Patient presents with    Weight Management     Pt is here today for adipex #2. Pt previous weight was 224 lb, she is now 211 lb. Pt is down a total of 13 lbs. Last Yearly:  10/13/22    Last pap: 10/13/22    Last HPV: never      NURSE: LMD  PE:  Vital Signs  Blood pressure 118/68, height 5' 8\" (1.727 m), weight 211 lb (95.7 kg), last menstrual period 2022, not currently breastfeeding. Labs:    No results found for this visit on 11/10/22. NURSE: Milvia Barlow    HPI: The patient is here today for an Adipex follow-up visit. She has no complaints regarding this medication    Yes  PT denies fever, chills, nausea and vomiting       Objective: Her blood pressure is normal and she is lost 13 pounds. Assessment and Plan: We will continue added therapy at this time          Diagnosis Orders   1. Obesity (BMI 30.0-34. 9) phentermine (ADIPEX-P) 37.5 MG tablet                No follow-ups on file. FF: 10 minutes    There are no Patient Instructions on file for this visit. Over 75%of time spent on counseling and care coordination on: see assessment and plan,  She was also counseled on her preventative health maintenance recommendations and follow-up.       Zakiya Gallo MD,11/10/2022 9:36 AM

## 2022-11-14 ENCOUNTER — TELEPHONE (OUTPATIENT)
Dept: OBGYN | Age: 37
End: 2022-11-14

## 2022-11-14 DIAGNOSIS — N30.01 ACUTE CYSTITIS WITH HEMATURIA: Primary | ICD-10-CM

## 2022-11-14 RX ORDER — NITROFURANTOIN 25; 75 MG/1; MG/1
100 CAPSULE ORAL 2 TIMES DAILY
Qty: 14 CAPSULE | Refills: 0 | Status: SHIPPED | OUTPATIENT
Start: 2022-11-14 | End: 2022-11-21

## 2022-11-14 RX ORDER — PHENAZOPYRIDINE HYDROCHLORIDE 200 MG/1
200 TABLET, FILM COATED ORAL 3 TIMES DAILY PRN
Qty: 6 TABLET | Refills: 0 | Status: SHIPPED | OUTPATIENT
Start: 2022-11-14 | End: 2022-11-16

## 2022-11-14 NOTE — TELEPHONE ENCOUNTER
Pt called in and with complaints of blood in urine and feeling as if she can not empty her bladder fully, pt is requesting medication be prescribed for uti. Informed pt she would need to see her PCP for this, she does not currently have one and would like us to treat this today and she will look into pcp.

## 2022-12-08 ENCOUNTER — OFFICE VISIT (OUTPATIENT)
Dept: OBGYN | Age: 37
End: 2022-12-08
Payer: COMMERCIAL

## 2022-12-08 VITALS — DIASTOLIC BLOOD PRESSURE: 64 MMHG | SYSTOLIC BLOOD PRESSURE: 110 MMHG

## 2022-12-08 DIAGNOSIS — E66.9 OBESITY (BMI 30.0-34.9): ICD-10-CM

## 2022-12-08 PROCEDURE — 99212 OFFICE O/P EST SF 10 MIN: CPT | Performed by: OBSTETRICS & GYNECOLOGY

## 2022-12-08 RX ORDER — PHENTERMINE HYDROCHLORIDE 37.5 MG/1
37.5 TABLET ORAL
Qty: 30 TABLET | Refills: 0 | Status: SHIPPED | OUTPATIENT
Start: 2022-12-08 | End: 2023-01-07

## 2022-12-08 NOTE — PROGRESS NOTES
PROBLEM VISIT     Date of service: 2022    Robert Worthington  Is a 44 y.o. single female    PT's PCP is: None Provider     : 1985                                             Subjective:       Patient's last menstrual period was 2022 (approximate). OB History    Para Term  AB Living   7 4 4   3     SAB IAB Ectopic Molar Multiple Live Births   3                # Outcome Date GA Lbr Ramiro/2nd Weight Sex Delivery Anes PTL Lv   7 SAB  10w0d          6 Term  37w0d   M CS-Unspec      5 Term  37w0d   M CS-Unspec      4 SAB  8w0d          3 Term  38w0d   M Vag-Spont         Birth Comments: stillborn   2 SAB  8w0d          1 Term  37w0d   M Vag-Spont           Social History     Tobacco Use   Smoking Status Former   Smokeless Tobacco Never        Social History     Substance and Sexual Activity   Alcohol Use Yes    Comment: social       Social History     Substance and Sexual Activity   Sexual Activity Yes    Partners: Male       Allergies: Patient has no known allergies. Chief Complaint   Patient presents with    Weight Management     Pt is here today for adipex #3. Pt is down a total of 6 lbs. Last Yearly:  10/13/22    Last pap: 10/13/22    Last HPV: never      NURSE: LMD  PE:  Vital Signs  Blood pressure 110/64, last menstrual period 2022, not currently breastfeeding. Labs:    No results found for this visit on 22. NURSE: Roberta King    HPI: The patient is here today for an Adipex follow-up visit she is doing well with the medication and has no problems or concerns regarding it. Yes  PT denies fever, chills, nausea and vomiting       Objective: Normal blood pressure and further weight loss are noted                           Assessment and Plan: We will give final refill of Adipex          Diagnosis Orders   1. Obesity (BMI 30.0-34. 9)                  No follow-ups on file.     FF: 10 minutes    There are no Patient Instructions on file for this visit. Over 75%of time spent on counseling and care coordination on: see assessment and plan,  She was also counseled on her preventative health maintenance recommendations and follow-up.       Hallie Alba MD,12/8/2022 8:58 AM

## 2022-12-09 RX ORDER — SUMATRIPTAN 50 MG/1
TABLET, FILM COATED ORAL
Qty: 9 TABLET | Refills: 3 | Status: SHIPPED | OUTPATIENT
Start: 2022-12-09

## 2024-09-09 ENCOUNTER — HOSPITAL ENCOUNTER (OUTPATIENT)
Age: 39
Setting detail: SPECIMEN
Discharge: HOME | End: 2024-09-09
Payer: COMMERCIAL

## 2024-09-09 ENCOUNTER — OFFICE VISIT (OUTPATIENT)
Dept: OBGYN | Age: 39
End: 2024-09-09
Payer: COMMERCIAL

## 2024-09-09 VITALS
WEIGHT: 181 LBS | BODY MASS INDEX: 27.43 KG/M2 | SYSTOLIC BLOOD PRESSURE: 122 MMHG | HEIGHT: 68 IN | DIASTOLIC BLOOD PRESSURE: 64 MMHG

## 2024-09-09 DIAGNOSIS — Z01.419 WOMEN'S ANNUAL ROUTINE GYNECOLOGICAL EXAMINATION: ICD-10-CM

## 2024-09-09 DIAGNOSIS — F41.8 SITUATIONAL ANXIETY: ICD-10-CM

## 2024-09-09 DIAGNOSIS — Z01.419 WOMEN'S ANNUAL ROUTINE GYNECOLOGICAL EXAMINATION: Primary | ICD-10-CM

## 2024-09-09 PROCEDURE — G0145 SCR C/V CYTO,THINLAYER,RESCR: HCPCS

## 2024-09-09 PROCEDURE — 99396 PREV VISIT EST AGE 40-64: CPT | Performed by: OBSTETRICS & GYNECOLOGY

## 2024-09-09 RX ORDER — CITALOPRAM HYDROBROMIDE 20 MG/1
20 TABLET ORAL DAILY
Qty: 30 TABLET | Refills: 12 | Status: SHIPPED | OUTPATIENT
Start: 2024-09-09

## 2024-09-11 DIAGNOSIS — F41.8 SITUATIONAL ANXIETY: Primary | ICD-10-CM

## 2024-09-16 LAB — CYTOLOGY REPORT: NORMAL

## 2025-02-08 DIAGNOSIS — F41.8 SITUATIONAL ANXIETY: ICD-10-CM
